# Patient Record
Sex: FEMALE | Race: BLACK OR AFRICAN AMERICAN | NOT HISPANIC OR LATINO
[De-identification: names, ages, dates, MRNs, and addresses within clinical notes are randomized per-mention and may not be internally consistent; named-entity substitution may affect disease eponyms.]

---

## 2023-08-02 PROBLEM — Z00.00 ENCOUNTER FOR PREVENTIVE HEALTH EXAMINATION: Status: ACTIVE | Noted: 2023-08-02

## 2023-08-07 ENCOUNTER — APPOINTMENT (OUTPATIENT)
Dept: NEPHROLOGY | Facility: CLINIC | Age: 78
End: 2023-08-07
Payer: MEDICARE

## 2023-08-07 VITALS
OXYGEN SATURATION: 96 % | HEART RATE: 80 BPM | BODY MASS INDEX: 24.63 KG/M2 | WEIGHT: 139 LBS | HEIGHT: 63 IN | DIASTOLIC BLOOD PRESSURE: 74 MMHG | SYSTOLIC BLOOD PRESSURE: 142 MMHG

## 2023-08-07 DIAGNOSIS — Z86.79 PERSONAL HISTORY OF OTHER DISEASES OF THE CIRCULATORY SYSTEM: ICD-10-CM

## 2023-08-07 DIAGNOSIS — Z80.42 FAMILY HISTORY OF MALIGNANT NEOPLASM OF PROSTATE: ICD-10-CM

## 2023-08-07 DIAGNOSIS — Z83.49 FAMILY HISTORY OF OTHER ENDOCRINE, NUTRITIONAL AND METABOLIC DISEASES: ICD-10-CM

## 2023-08-07 DIAGNOSIS — G30.9 ALZHEIMER'S DISEASE, UNSPECIFIED: ICD-10-CM

## 2023-08-07 DIAGNOSIS — Z86.2 PERSONAL HISTORY OF DISEASES OF THE BLOOD AND BLOOD-FORMING ORGANS AND CERTAIN DISORDERS INVOLVING THE IMMUNE MECHANISM: ICD-10-CM

## 2023-08-07 DIAGNOSIS — L93.0 DISCOID LUPUS ERYTHEMATOSUS: ICD-10-CM

## 2023-08-07 DIAGNOSIS — Z78.9 OTHER SPECIFIED HEALTH STATUS: ICD-10-CM

## 2023-08-07 DIAGNOSIS — F02.80 DEMENTIA IN OTHER DISEASES CLASSIFIED ELSEWHERE W/OUT BEHAVIORAL DISTURBANCE: ICD-10-CM

## 2023-08-07 DIAGNOSIS — F02.80 ALZHEIMER'S DISEASE, UNSPECIFIED: ICD-10-CM

## 2023-08-07 PROCEDURE — 99203 OFFICE O/P NEW LOW 30 MIN: CPT

## 2023-08-07 RX ORDER — AMLODIPINE BESYLATE 10 MG/1
10 TABLET ORAL
Refills: 0 | Status: ACTIVE | COMMUNITY

## 2023-08-07 RX ORDER — DONEPEZIL HYDROCHLORIDE 10 MG/1
10 TABLET ORAL
Refills: 0 | Status: ACTIVE | COMMUNITY

## 2023-08-07 RX ORDER — MIRTAZAPINE 30 MG/1
30 TABLET, FILM COATED ORAL
Refills: 0 | Status: ACTIVE | COMMUNITY

## 2023-08-07 NOTE — HISTORY OF PRESENT ILLNESS
[FreeTextEntry1] : Pt is a 78-year-old female with history of Alzheimer's disease, dementia, HTN, discoid lupus erythematosus and hyperparathyroidism who came to the clinic for the initial evaluation of hypokalemia. History obtained from pts daughter present during the interview as pt has dementia.   Per daughter, Pt was made aware that she has low serum potassium. On review of records provided by the pt, serum potassium was low at 2.9 on 10/22/20. Serum potassium remains low at 3 on 12/8/21. Recent serum potassium was low at 2.7 on labs done on 4/19/23. Per daughter, Pt was advised to take oral potassium supplements for low potassium. Scr was stable at 0.8 on labs done on 4/19/23. UPCR was WNL at 0.1 on 12/8/21. UA showed trace proteins on 12/8/21. Serum NIALL showed faint band in IgM present against dense polyclonal background. Per daughter, Pt has intermittent diarrhea for months. Pt. Denies history of kidney disease or kidney stones in the past. No history of kidney disease in family. Denies recent use of NSAIDs/ motrin recently.   Pt. currently feels well. Pt. denies any chest pain, SOB, nausea, dysuria, weakness. Per daughter diarrhea is better than before, ~2 BMs per day.

## 2023-08-07 NOTE — PHYSICAL EXAM
[General Appearance - Alert] : alert [General Appearance - In No Acute Distress] : in no acute distress [Sclera] : the sclera and conjunctiva were normal [Outer Ear] : the ears and nose were normal in appearance [Jugular Venous Distention Increased] : there was no jugular-venous distention [Auscultation Breath Sounds / Voice Sounds] : lungs were clear to auscultation bilaterally [Heart Rate And Rhythm] : heart rate was normal and rhythm regular [Heart Sounds] : normal S1 and S2 [Heart Sounds Gallop] : no gallops [Edema] : there was no peripheral edema [Bowel Sounds] : normal bowel sounds [Abdomen Soft] : soft [No CVA Tenderness] : no ~M costovertebral angle tenderness [Nail Clubbing] : no clubbing  or cyanosis of the fingernails [] : no rash [Affect] : the affect was normal [FreeTextEntry1] : Limited as pt has dementia

## 2023-08-07 NOTE — REVIEW OF SYSTEMS
[Diarrhea] : diarrhea [As Noted in HPI] : as noted in HPI [Fever] : no fever [Chills] : no chills [Dry Eyes] : no dryness of the eyes [Sore Throat] : no sore throat [Chest Pain] : no chest pain [Palpitations] : no palpitations [Cough] : no cough [SOB on Exertion] : no shortness of breath during exertion [Abdominal Pain] : no abdominal pain [Constipation] : no constipation [Dysuria] : no dysuria [Limb Pain] : no limb pain [Itching] : no itching [Dizziness] : no dizziness [Fainting] : no fainting [Anxiety] : no anxiety

## 2023-08-07 NOTE — ASSESSMENT
[FreeTextEntry1] : 1.Hypkalemia: Unclear etiology for now, likely in the setting of chronic diarrhea (? from donepezil induced). Per daughter, Pt was made aware that she has low serum potassium. On review of records provided by the pt, serum potassium was low at 2.9 on 10/22/20. Serum potassium remains low at 3 on 12/8/21. Recent serum potassium was low at 2.7 on labs done on 4/19/23. Scr was stable at 0.8 on labs done on 4/19/23. UPCR was WNL at 0.1 on 12/8/21. UA showed trace proteins on 12/8/21. Check renal panel, urine potassium, urine sodium and chloride today. Check serum magnesium levels. Repeat UPCR. Importance of good BP control reviewed with patient.  Advised patient to avoid NSAIDs, RCAs, and other nephrotoxins. Monitor serum potassium.  2.HTN: Repeat BP reading in acceptable range during office visit. Continue to monitor BP on current BP meds. Low salt diet advised.  3.Hyperparathyroidism: Last serum intact PTH level was elevated at 126 on 4/19/23. Pt is following with PCP for hyperparathyroidism. Monitor serum intact PTH.   Pt advised to follow up with PCP regarding serum NIALL results.  Follow up pending lab results.

## 2023-08-09 ENCOUNTER — NON-APPOINTMENT (OUTPATIENT)
Age: 78
End: 2023-08-09

## 2023-08-09 LAB
ALBUMIN SERPL ELPH-MCNC: 4.6 G/DL
ANION GAP SERPL CALC-SCNC: 14 MMOL/L
BUN SERPL-MCNC: 15 MG/DL
CALCIUM SERPL-MCNC: 10.2 MG/DL
CHLORIDE SERPL-SCNC: 102 MMOL/L
CO2 SERPL-SCNC: 27 MMOL/L
CREAT SERPL-MCNC: 0.97 MG/DL
EGFR: 60 ML/MIN/1.73M2
GLUCOSE SERPL-MCNC: 95 MG/DL
MAGNESIUM SERPL-MCNC: 2.1 MG/DL
PHOSPHATE SERPL-MCNC: 3.2 MG/DL
POTASSIUM SERPL-SCNC: 3.1 MMOL/L
SODIUM SERPL-SCNC: 143 MMOL/L

## 2023-08-11 ENCOUNTER — NON-APPOINTMENT (OUTPATIENT)
Age: 78
End: 2023-08-11

## 2023-08-11 LAB
CHLORIDE ?TM UR-SCNC: 36 MMOL/L
CREAT SPEC-SCNC: 83 MG/DL
CREAT/PROT UR: 0.1 RATIO
POTASSIUM UR-SCNC: 18.4 MMOL/L
PROT UR-MCNC: 7 MG/DL
SODIUM ?TM SUB UR QN: 44 MMOL/L

## 2023-09-06 ENCOUNTER — APPOINTMENT (OUTPATIENT)
Dept: NEPHROLOGY | Facility: CLINIC | Age: 78
End: 2023-09-06
Payer: MEDICARE

## 2023-09-06 VITALS
DIASTOLIC BLOOD PRESSURE: 66 MMHG | WEIGHT: 138 LBS | HEART RATE: 71 BPM | BODY MASS INDEX: 24.45 KG/M2 | OXYGEN SATURATION: 99 % | SYSTOLIC BLOOD PRESSURE: 128 MMHG

## 2023-09-06 PROCEDURE — 99214 OFFICE O/P EST MOD 30 MIN: CPT

## 2023-09-06 NOTE — HISTORY OF PRESENT ILLNESS
[FreeTextEntry1] : Pt is a 78-year-old female with history of Alzheimer's disease, dementia, HTN, discoid lupus erythematosus and hyperparathyroidism who came to the clinic for follow up visit. Pt was last seen for the initial evaluation of hypokalemia on 8/7/23. History obtained from pts daughter present during the interview as pt has dementia.   Kidney history: Per daughter, Pt was made aware that she has low serum potassium. On review of records provided by the pt, serum potassium was low at 2.9 on 10/22/20. Serum potassium remains low at 3 on 12/8/21. Recent serum potassium was low at 3.1 on labs done on 8/7/23. Pt was advised to take oral potassium supplements for low potassium. Last serum potassium was WNL at 5 on labs done on 8/14/23. Last Scr was stable at 0.8 on labs done on 8/14/23. UPCR was WNL at 0.1 on 8/14/23. UA showed trace proteins on 12/8/21. Serum NIALL showed faint band in IgM present against dense polyclonal background. Per daughter, Pt has intermittent diarrhea for months.   Pt. currently feels well. Pt. denies any chest pain, SOB, nausea, dysuria, weakness. Per daughter diarrhea is better than before, ~2 BMs per day.

## 2023-09-06 NOTE — ASSESSMENT
[FreeTextEntry1] : 1.Hypokalemia: likely in the setting of chronic diarrhea (? from donepezil induced). Per daughter, Pt was made aware that she has low serum potassium. On review of records provided by the pt, serum potassium was low at 2.9 on 10/22/20. Serum potassium remains low at 3 on 12/8/21. Recent serum potassium was low at 3.1 on labs done on 8/7/23. Work up showed urine potassium low at 18.4 with serum magnesium WNL at 2.1 on labs done on 8/7/23. Pt was advised to take oral potassium supplements for low potassium. Last serum potassium was WNL at 5 on labs done on 8/14/23. Check serum potassium today. Will repeat serum NIALL today. Monitor serum potassium.  2.HTN: Repeat BP reading in acceptable range during office visit. Continue to monitor BP on current BP meds. Low salt diet advised.  3.Hyperparathyroidism: Last serum intact PTH level was elevated at 126 on 4/19/23. Pt is following with PCP for hyperparathyroidism. Monitor serum intact PTH.   4.CKD stage2: Last Scr was stable at 0.8 on labs done on 8/14/23. UPCR was WNL at 0.1 on 8/14/23. UA showed trace proteins on 12/8/21. Importance of good BP control reviewed with patient. Advised patient to avoid NSAIDs, RCAs, and other nephrotoxins. Monitor renal function.   Pt advised to follow up with PCP regarding serum NIALL results.  Follow up pending lab results.

## 2023-09-07 ENCOUNTER — NON-APPOINTMENT (OUTPATIENT)
Age: 78
End: 2023-09-07

## 2023-09-07 LAB
ALBUMIN SERPL ELPH-MCNC: 4.6 G/DL
ANION GAP SERPL CALC-SCNC: 12 MMOL/L
BUN SERPL-MCNC: 16 MG/DL
CALCIUM SERPL-MCNC: 10.2 MG/DL
CHLORIDE SERPL-SCNC: 102 MMOL/L
CO2 SERPL-SCNC: 25 MMOL/L
CREAT SERPL-MCNC: 0.96 MG/DL
EGFR: 61 ML/MIN/1.73M2
GLUCOSE SERPL-MCNC: 103 MG/DL
PHOSPHATE SERPL-MCNC: 3.4 MG/DL
POTASSIUM SERPL-SCNC: 3.8 MMOL/L
SODIUM SERPL-SCNC: 139 MMOL/L

## 2023-09-07 RX ORDER — POTASSIUM CHLORIDE 750 MG/1
10 TABLET, EXTENDED RELEASE ORAL DAILY
Qty: 60 | Refills: 2 | Status: ACTIVE | COMMUNITY
Start: 2023-08-09 | End: 1900-01-01

## 2023-09-08 LAB — M PROTEIN SPEC IFE-MCNC: NORMAL

## 2024-01-10 ENCOUNTER — APPOINTMENT (OUTPATIENT)
Dept: NEPHROLOGY | Facility: CLINIC | Age: 79
End: 2024-01-10
Payer: MEDICARE

## 2024-01-10 VITALS
HEIGHT: 63 IN | SYSTOLIC BLOOD PRESSURE: 126 MMHG | DIASTOLIC BLOOD PRESSURE: 68 MMHG | BODY MASS INDEX: 23.39 KG/M2 | WEIGHT: 132 LBS

## 2024-01-10 DIAGNOSIS — N18.2 CHRONIC KIDNEY DISEASE, STAGE 2 (MILD): ICD-10-CM

## 2024-01-10 DIAGNOSIS — I10 ESSENTIAL (PRIMARY) HYPERTENSION: ICD-10-CM

## 2024-01-10 DIAGNOSIS — E87.6 HYPOKALEMIA: ICD-10-CM

## 2024-01-10 DIAGNOSIS — E21.3 HYPERPARATHYROIDISM, UNSPECIFIED: ICD-10-CM

## 2024-01-10 PROCEDURE — 99214 OFFICE O/P EST MOD 30 MIN: CPT

## 2024-01-10 NOTE — HISTORY OF PRESENT ILLNESS
[FreeTextEntry1] : Pt is a 78-year-old female with history of Alzheimer's disease, dementia, HTN, discoid lupus erythematosus and hyperparathyroidism who came to the clinic for follow up visit. Pt was last seen for follow up visit on 9/6/23. History obtained from pts daughter present during the interview as pt has dementia.   Kidney history: Per daughter, Pt was made aware that she has low serum potassium. On review of records provided by the pt, serum potassium was low at 2.9 on 10/22/20. Serum potassium remains low at 3 on 12/8/21. Recent serum potassium was WNL at 3.7 on 11/29/23 (seen on pts phone). Pt currently taking oral potassium supplements for low potassium. Per pts daughter, Pt was recently seen in [facility] for dizziness and seizure like activity on 11/29/23. Pt is currently following with cardiology and neurology for further work up. Last Scr was stable at 0.8 on labs done on 11/2923. UPCR was WNL at 0.1 on 8/14/23. UA showed trace proteins on 12/8/21. Serum NIALL showed faint band in IgM present against dense polyclonal background.   Pt. currently feels well. Pt. denies any chest pain, SOB, nausea, dysuria, weakness. Per daughter diarrhea is better than before, ~2 BMs per day.

## 2024-01-10 NOTE — ASSESSMENT
[FreeTextEntry1] : 1.Hypokalemia: likely in the setting of chronic diarrhea ( from donepezil induced). Per daughter, Pt was made aware that she has low serum potassium. On review of records provided by the pt, serum potassium was low at 2.9 on 10/22/20. Serum potassium remains low at 3 on 12/8/21. Serum potassium was low at 3.1 on labs done on 8/7/23. Work up showed urine potassium low at 18.4 with serum magnesium WNL at 2.1 on labs done on 8/7/23. Pt was advised to take oral potassium supplements for low potassium. Last serum potassium was WNL at 3.7 on labs done on 11/29/23. Check serum potassium today. Monitor serum potassium.  2.HTN: Repeat BP reading in acceptable range during office visit. Continue to monitor BP on current BP meds. Low salt diet advised.  3.Hyperparathyroidism: Last serum intact PTH level was elevated at 126 on 4/19/23. Pt is following with PCP for hyperparathyroidism. Monitor serum intact PTH.  4.CKD stage2: Last Scr was stable at 0.8 on labs done on 11/29/23. UPCR was WNL at 0.1 on 8/14/23. UA showed trace proteins on 12/8/21. Importance of good BP control reviewed with patient. Check renal panel today. Advised patient to avoid NSAIDs, RCAs, and other nephrotoxins. Monitor renal function. . Follow up pending lab results.

## 2024-02-13 ENCOUNTER — RX RENEWAL (OUTPATIENT)
Age: 79
End: 2024-02-13

## 2024-02-13 RX ORDER — POTASSIUM CHLORIDE 750 MG/1
10 TABLET, EXTENDED RELEASE ORAL
Qty: 60 | Refills: 2 | Status: ACTIVE | COMMUNITY
Start: 2024-02-13 | End: 1900-01-01

## 2024-10-14 ENCOUNTER — APPOINTMENT (OUTPATIENT)
Dept: NEPHROLOGY | Facility: CLINIC | Age: 79
End: 2024-10-14

## 2024-10-14 ENCOUNTER — NON-APPOINTMENT (OUTPATIENT)
Age: 79
End: 2024-10-14

## 2024-10-14 VITALS
DIASTOLIC BLOOD PRESSURE: 66 MMHG | OXYGEN SATURATION: 93 % | WEIGHT: 133 LBS | BODY MASS INDEX: 23.56 KG/M2 | SYSTOLIC BLOOD PRESSURE: 122 MMHG | HEART RATE: 65 BPM

## 2024-10-14 DIAGNOSIS — I10 ESSENTIAL (PRIMARY) HYPERTENSION: ICD-10-CM

## 2024-10-14 DIAGNOSIS — E87.6 HYPOKALEMIA: ICD-10-CM

## 2024-10-14 DIAGNOSIS — E21.3 HYPERPARATHYROIDISM, UNSPECIFIED: ICD-10-CM

## 2024-10-14 PROCEDURE — 99213 OFFICE O/P EST LOW 20 MIN: CPT

## 2024-10-14 PROCEDURE — G2211 COMPLEX E/M VISIT ADD ON: CPT

## 2024-10-14 RX ORDER — DIVALPROEX SODIUM 250 MG/1
250 TABLET, DELAYED RELEASE ORAL
Refills: 0 | Status: ACTIVE | COMMUNITY
Start: 2024-10-14

## 2024-10-15 LAB
CREAT SPEC-SCNC: 76 MG/DL
CREAT SPEC-SCNC: 76 MG/DL
CREAT/PROT UR: 0.1 RATIO
PROT UR-MCNC: 10 MG/DL
SODIUM ?TM SUB UR QN: 149 MMOL/L

## 2024-10-16 LAB
POTASSIUM UR-SCNC: 34.5 MMOL/L
SODIUM ?TM SUB UR QN: 146 MMOL/L

## 2025-04-08 ENCOUNTER — RX RENEWAL (OUTPATIENT)
Age: 80
End: 2025-04-08